# Patient Record
Sex: MALE | Race: WHITE | NOT HISPANIC OR LATINO | Employment: PART TIME | URBAN - NONMETROPOLITAN AREA
[De-identification: names, ages, dates, MRNs, and addresses within clinical notes are randomized per-mention and may not be internally consistent; named-entity substitution may affect disease eponyms.]

---

## 2023-04-13 DIAGNOSIS — K21.9 GASTROESOPHAGEAL REFLUX DISEASE, UNSPECIFIED WHETHER ESOPHAGITIS PRESENT: Primary | ICD-10-CM

## 2023-04-13 RX ORDER — FAMOTIDINE 20 MG/1
20 TABLET, FILM COATED ORAL 2 TIMES DAILY
Qty: 60 TABLET | Refills: 1 | Status: SHIPPED | OUTPATIENT
Start: 2023-04-13 | End: 2023-10-04 | Stop reason: SDUPTHER

## 2023-10-04 ENCOUNTER — OFFICE VISIT (OUTPATIENT)
Dept: PRIMARY CARE | Facility: CLINIC | Age: 38
End: 2023-10-04
Payer: COMMERCIAL

## 2023-10-04 VITALS
SYSTOLIC BLOOD PRESSURE: 92 MMHG | HEIGHT: 76 IN | WEIGHT: 171.8 LBS | BODY MASS INDEX: 20.92 KG/M2 | TEMPERATURE: 97.4 F | OXYGEN SATURATION: 99 % | HEART RATE: 67 BPM | DIASTOLIC BLOOD PRESSURE: 68 MMHG

## 2023-10-04 DIAGNOSIS — F51.04 PSYCHOPHYSIOLOGICAL INSOMNIA: ICD-10-CM

## 2023-10-04 DIAGNOSIS — J45.20 MILD INTERMITTENT ASTHMA WITHOUT COMPLICATION (HHS-HCC): ICD-10-CM

## 2023-10-04 DIAGNOSIS — K21.9 GASTROESOPHAGEAL REFLUX DISEASE, UNSPECIFIED WHETHER ESOPHAGITIS PRESENT: ICD-10-CM

## 2023-10-04 DIAGNOSIS — Z00.00 ROUTINE GENERAL MEDICAL EXAMINATION AT A HEALTH CARE FACILITY: ICD-10-CM

## 2023-10-04 DIAGNOSIS — I51.7 RIGHT VENTRICULAR HYPERTROPHY BY ELECTROCARDIOGRAM: ICD-10-CM

## 2023-10-04 DIAGNOSIS — F32.A ANXIETY AND DEPRESSION: Primary | ICD-10-CM

## 2023-10-04 DIAGNOSIS — J30.1 NON-SEASONAL ALLERGIC RHINITIS DUE TO POLLEN: ICD-10-CM

## 2023-10-04 DIAGNOSIS — E78.5 BORDERLINE HYPERLIPIDEMIA: ICD-10-CM

## 2023-10-04 DIAGNOSIS — E55.9 VITAMIN D INSUFFICIENCY: ICD-10-CM

## 2023-10-04 DIAGNOSIS — F41.9 ANXIETY AND DEPRESSION: Primary | ICD-10-CM

## 2023-10-04 PROBLEM — R53.83 FATIGUE: Status: ACTIVE | Noted: 2023-10-04

## 2023-10-04 PROBLEM — F17.210 CIGARETTE NICOTINE DEPENDENCE WITHOUT COMPLICATION: Status: ACTIVE | Noted: 2023-10-04

## 2023-10-04 PROBLEM — J30.89 NON-SEASONAL ALLERGIC RHINITIS: Status: ACTIVE | Noted: 2023-10-04

## 2023-10-04 PROBLEM — J01.01 ACUTE RECURRENT MAXILLARY SINUSITIS: Status: RESOLVED | Noted: 2023-10-04 | Resolved: 2023-10-04

## 2023-10-04 PROBLEM — S82.891A FRACTURE OF ANKLE, RIGHT, CLOSED: Status: RESOLVED | Noted: 2023-10-04 | Resolved: 2023-10-04

## 2023-10-04 PROBLEM — K40.90 LEFT INGUINAL HERNIA: Status: RESOLVED | Noted: 2023-10-04 | Resolved: 2023-10-04

## 2023-10-04 PROCEDURE — 99214 OFFICE O/P EST MOD 30 MIN: CPT | Performed by: PHYSICIAN ASSISTANT

## 2023-10-04 RX ORDER — FAMOTIDINE 20 MG/1
20 TABLET, FILM COATED ORAL 2 TIMES DAILY
Qty: 180 TABLET | Refills: 1 | Status: SHIPPED | OUTPATIENT
Start: 2023-10-04 | End: 2024-03-18

## 2023-10-04 RX ORDER — LORATADINE 10 MG/1
1 TABLET ORAL DAILY
COMMUNITY
Start: 2021-02-26 | End: 2023-10-04 | Stop reason: SDUPTHER

## 2023-10-04 RX ORDER — TRAZODONE HYDROCHLORIDE 50 MG/1
1 TABLET ORAL NIGHTLY
COMMUNITY
Start: 2020-10-08 | End: 2023-10-04 | Stop reason: SDUPTHER

## 2023-10-04 RX ORDER — LORATADINE 10 MG/1
10 TABLET ORAL DAILY
Qty: 90 TABLET | Refills: 1 | Status: SHIPPED | OUTPATIENT
Start: 2023-10-04 | End: 2024-04-01

## 2023-10-04 RX ORDER — TRAZODONE HYDROCHLORIDE 50 MG/1
50 TABLET ORAL NIGHTLY
Qty: 90 TABLET | Refills: 1 | Status: SHIPPED | OUTPATIENT
Start: 2023-10-04 | End: 2024-04-01

## 2023-10-04 RX ORDER — ESCITALOPRAM OXALATE 20 MG/1
20 TABLET ORAL DAILY
Qty: 90 TABLET | Refills: 1 | Status: SHIPPED | OUTPATIENT
Start: 2023-10-04 | End: 2024-04-01

## 2023-10-04 RX ORDER — BUSPIRONE HYDROCHLORIDE 15 MG/1
15 TABLET ORAL 2 TIMES DAILY
Qty: 360 TABLET | Refills: 0 | Status: SHIPPED | OUTPATIENT
Start: 2023-10-04 | End: 2024-04-01

## 2023-10-04 RX ORDER — FLUTICASONE PROPIONATE 50 MCG
2 SPRAY, SUSPENSION (ML) NASAL 2 TIMES DAILY
Qty: 16 G | Refills: 5 | Status: SHIPPED | OUTPATIENT
Start: 2023-10-04 | End: 2024-04-01

## 2023-10-04 RX ORDER — ALBUTEROL SULFATE 0.83 MG/ML
2.5 SOLUTION RESPIRATORY (INHALATION) EVERY 6 HOURS PRN
Qty: 90 ML | Refills: 3 | Status: SHIPPED | OUTPATIENT
Start: 2023-10-04 | End: 2024-01-16

## 2023-10-04 RX ORDER — ALBUTEROL SULFATE 90 UG/1
2 AEROSOL, METERED RESPIRATORY (INHALATION) EVERY 6 HOURS PRN
Qty: 18 G | Refills: 1 | Status: SHIPPED | OUTPATIENT
Start: 2023-10-04

## 2023-10-04 NOTE — ASSESSMENT & PLAN NOTE
Referred to cardiology  - Please call cardiology for follow up due to family history of 4 heart transplants on Mom's side, personal history of cardiomegaly and right ventricle enlargement. Son with pectus excavatum?

## 2023-10-04 NOTE — PATIENT INSTRUCTIONS
Please call cardiology for follow up due to family history of 4 heart transplants on Mom's side, personal history of cardiomegaly and right ventricle  enlargement.

## 2023-10-04 NOTE — PROGRESS NOTES
Subjective     HPI   Usman Villarreal is a 37 y.o. year old male patient with presenting to clinic with concern for   Chief Complaint   Patient presents with    New Patient Visit     Establish care.        Heart history, family cardiac hx of transplants.  Had several loved ones who  in the past 2 years. Now making time to care for himself.     Anxiety has worsened, requesting increased dose of buspar.     Patient History:  Smoker:            Current.  Pertinent History: Abnormal EKG, fatigue, possible right ventricular hypertrohy                     on EKG.       PHYSICIAN INTERPRETATION:  Left Ventricle: The left ventricular systolic function is normal, with an estimated ejection fraction of 60-65%. There are no regional wall motion abnormalities. The left ventricular cavity size is normal. Spectral Doppler shows a normal pattern of left ventricular diastolic filling.  Left Atrium: The left atrium is normal in size.  Right Ventricle: The right ventricle is slightly enlarged. There is normal right ventricular global systolic function.       CONCLUSIONS:   1. The left ventricular systolic function is normal with a 60-65% estimated ejection fraction    Patient Active Problem List   Diagnosis    Right ventricular hypertrophy by electrocardiogram    Anxiety and depression    Enlarged heart    Fatigue    Psychophysiological insomnia    Non-seasonal allergic rhinitis    Cigarette nicotine dependence without complication       Past Medical History:   Diagnosis Date    Left inguinal hernia 10/04/2023      Past Surgical History:   Procedure Laterality Date    OTHER SURGICAL HISTORY  2020    Hernia repair      Family History   Problem Relation Name Age of Onset    Colon cancer Mother      Heart disease Maternal Grandfather      Diabetes Other      Prostate cancer Other        Social History     Tobacco Use    Smoking status: Every Day     Packs/day: 0.50     Years: 25.00     Additional pack years: 0.00     Total  "pack years: 12.50     Types: Cigarettes    Smokeless tobacco: Never   Substance Use Topics    Alcohol use: Yes     Comment: social        Current Outpatient Medications:     albuterol 90 mcg/actuation inhaler, 2 puffs every 4 hours if needed., Disp: , Rfl:     busPIRone (Buspar) 15 mg tablet, Take 1 tablet (15 mg) by mouth once daily., Disp: , Rfl:     escitalopram (Lexapro) 20 mg tablet, Take 1 tablet (20 mg) by mouth once daily., Disp: , Rfl:     famotidine (Pepcid) 20 mg tablet, Take 1 tablet (20 mg) by mouth in the morning and 1 tablet (20 mg) before bedtime., Disp: 60 tablet, Rfl: 1    fluticasone (Flonase) 50 mcg/actuation nasal spray, Administer 2 sprays into each nostril 2 times a day., Disp: , Rfl:     loratadine (Claritin) 10 mg tablet, Take 1 tablet (10 mg) by mouth once daily., Disp: , Rfl:     traZODone (Desyrel) 50 mg tablet, Take 1 tablet (50 mg) by mouth once daily at bedtime., Disp: , Rfl:      Review of Systems  Constitutional: Denies fever  HEENT: Denies ST, earache  CVS: Denies Chest pain  Pulmonary: Denies wheezing, SOB  GI: Denies N/V  : Denies dysuria  Musculoskeletal:  Denies myalgia  Neuro: Denies focal weakness or numbness.  Skin: Denies Rashes.  *Review of Systems is negative unless otherwise mentioned in HPI or ROS above.    Objective   BP 92/68   Pulse 67   Temp 36.3 °C (97.4 °F)   Ht 1.93 m (6' 4\")   Wt 77.9 kg (171 lb 12.8 oz)   SpO2 99%   BMI 20.91 kg/m²  reviewed Body mass index is 20.91 kg/m².     Physical Exam  Constitutional: NAD.  Resting comfortably.  Head: Atraumatic, normocephalic.  ENT: Moist oral mucosa. Nasal mucosa wnl.   Cardiac: Regular rate & rhythm.   Pulmonary: Lungs clear bilat  GI: Soft, Nontender, nondistended.   Musculoskeletal: No peripheral edema.   Skin: No evidence of trauma. No rashes  Psych: Intact judgement and insight.    .Assessment/Plan   Problem List Items Addressed This Visit             ICD-10-CM    Right ventricular hypertrophy by " electrocardiogram I51.7     Left Ventricle: The left ventricular systolic function is normal, with an estimated ejection fraction of 60-65%. There are no regional wall motion abnormalities. The left ventricular cavity size is normal. Spectral Doppler shows a normal pattern of left ventricular diastolic filling.  Left Atrium: The left atrium is normal in size.  Right Ventricle: The right ventricle is slightly enlarged. There is normal right ventricular global systolic function.         Anxiety and depression - Primary F41.9, F32.A    Relevant Medications    busPIRone (Buspar) 15 mg tablet    escitalopram (Lexapro) 20 mg tablet    Psychophysiological insomnia F51.04    Relevant Medications    traZODone (Desyrel) 50 mg tablet    Non-seasonal allergic rhinitis J30.89    Relevant Medications    fluticasone (Flonase) 50 mcg/actuation nasal spray    loratadine (Claritin) 10 mg tablet     Other Visit Diagnoses         Codes    Gastroesophageal reflux disease, unspecified whether esophagitis present     K21.9    Relevant Medications    famotidine (Pepcid) 20 mg tablet    Mild intermittent asthma without complication     J45.20    Relevant Medications    albuterol 90 mcg/actuation inhaler    albuterol 2.5 mg /3 mL (0.083 %) nebulizer solution    Routine general medical examination at a health care facility     Z00.00    Borderline hyperlipidemia     E78.5    Vitamin D insufficiency     E55.9

## 2023-10-04 NOTE — ASSESSMENT & PLAN NOTE
Left Ventricle: The left ventricular systolic function is normal, with an estimated ejection fraction of 60-65%. There are no regional wall motion abnormalities. The left ventricular cavity size is normal. Spectral Doppler shows a normal pattern of left ventricular diastolic filling.  Left Atrium: The left atrium is normal in size.  Right Ventricle: The right ventricle is slightly enlarged. There is normal right ventricular global systolic function.

## 2023-11-05 PROBLEM — H60.339 SWIMMER'S EAR: Status: ACTIVE | Noted: 2023-11-05

## 2023-11-05 PROBLEM — M25.571 RIGHT ANKLE PAIN: Status: ACTIVE | Noted: 2023-11-05

## 2023-11-05 PROBLEM — H00.015 HORDEOLUM EXTERNUM OF LEFT LOWER EYELID: Status: ACTIVE | Noted: 2023-11-05

## 2023-11-05 PROBLEM — R94.31 ABNORMAL EKG: Status: ACTIVE | Noted: 2023-10-04

## 2023-11-05 PROBLEM — K40.91 UNILATERAL RECURRENT INGUINAL HERNIA WITHOUT OBSTRUCTION OR GANGRENE: Status: ACTIVE | Noted: 2023-11-05

## 2023-11-05 PROBLEM — F17.200 NICOTINE ADDICTION: Status: ACTIVE | Noted: 2023-10-04

## 2023-11-05 PROBLEM — K21.9 GERD (GASTROESOPHAGEAL REFLUX DISEASE): Status: ACTIVE | Noted: 2023-11-05

## 2023-11-05 PROBLEM — F41.1 GENERALIZED ANXIETY DISORDER: Status: ACTIVE | Noted: 2023-10-04

## 2023-11-05 RX ORDER — CHLORHEXIDINE GLUCONATE ORAL RINSE 1.2 MG/ML
15 SOLUTION DENTAL 2 TIMES DAILY
COMMUNITY
Start: 2023-01-17

## 2023-11-05 RX ORDER — HYDROCODONE BITARTRATE AND ACETAMINOPHEN 5; 325 MG/1; MG/1
1 TABLET ORAL AS NEEDED
COMMUNITY
Start: 2023-02-13

## 2023-11-05 RX ORDER — HYDROXYZINE HYDROCHLORIDE 50 MG/1
50 TABLET, FILM COATED ORAL 3 TIMES DAILY PRN
COMMUNITY
Start: 2021-02-08

## 2023-11-07 ENCOUNTER — OFFICE VISIT (OUTPATIENT)
Dept: CARDIOLOGY | Facility: CLINIC | Age: 38
End: 2023-11-07
Payer: COMMERCIAL

## 2023-11-07 ENCOUNTER — HOSPITAL ENCOUNTER (OUTPATIENT)
Dept: CARDIOLOGY | Facility: HOSPITAL | Age: 38
Discharge: HOME | End: 2023-11-07
Payer: COMMERCIAL

## 2023-11-07 VITALS
BODY MASS INDEX: 20.86 KG/M2 | SYSTOLIC BLOOD PRESSURE: 123 MMHG | OXYGEN SATURATION: 99 % | HEART RATE: 69 BPM | WEIGHT: 171.4 LBS | DIASTOLIC BLOOD PRESSURE: 77 MMHG

## 2023-11-07 DIAGNOSIS — R94.31 ABNORMAL EKG: ICD-10-CM

## 2023-11-07 DIAGNOSIS — F17.210 CIGARETTE NICOTINE DEPENDENCE WITHOUT COMPLICATION: ICD-10-CM

## 2023-11-07 DIAGNOSIS — R00.2 PALPITATIONS: ICD-10-CM

## 2023-11-07 DIAGNOSIS — I51.7 LVH (LEFT VENTRICULAR HYPERTROPHY): Primary | ICD-10-CM

## 2023-11-07 PROCEDURE — 93010 ELECTROCARDIOGRAM REPORT: CPT | Performed by: INTERNAL MEDICINE

## 2023-11-07 PROCEDURE — 99214 OFFICE O/P EST MOD 30 MIN: CPT | Performed by: NURSE PRACTITIONER

## 2023-11-07 PROCEDURE — 93005 ELECTROCARDIOGRAM TRACING: CPT

## 2023-11-07 NOTE — PROGRESS NOTES
Primary Care Physician: Lisa Chen PA-C  Primary Cardiologist:       Date of Visit: 11/07/2023 10:40 AM EST  [facility] of visit:  [facility]   Type of Visit: Follow up             Chief Complaint   Patient presents with    New Patient Visit     Referred by Lisa Chen/ follow up         HPI / Summary:   Usman Villarreal is a 37 y.o. male active smoker  with LVH by EKG    returns for routine follow up     C/o palpitations on exertion, resolve with rest.  No chest discomfort or unusual dyspnea.  He continues to smoke cigarettes.  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        History of cardiac transplant and sudden cardiac death on his mothers side of the family, although they are not close and he does not know the details       12 system review is negative except as noted above  Accompanied by a friend  who contributed to the history       Medical History:   Past Medical History:   Diagnosis Date    Left inguinal hernia 10/04/2023       Social History:   Tobacco Use: High Risk (11/7/2023)    Patient History     Smoking Tobacco Use: Every Day     Smokeless Tobacco Use: Never     Passive Exposure: Not on file         MEDICATIONS:   Current Outpatient Medications   Medication Instructions    albuterol 90 mcg/actuation inhaler 2 puffs, inhalation, Every 6 hours PRN    albuterol 2.5 mg, nebulization, Every 6 hours PRN    busPIRone (BUSPAR) 15 mg, oral, 2 times daily    chlorhexidine (Peridex) 0.12 % solution 15 mL, Mouth/Throat, 2 times daily, USE 15 ML BY MOUTH TWICE AS INSTRUCTED THE NIGHT BEFORE SURGERY AND THE MORNING OF.<BR>    escitalopram (LEXAPRO) 20 mg, oral, Daily    famotidine (PEPCID) 20 mg, oral, 2 times daily    fluticasone (Flonase) 50 mcg/actuation nasal spray 2 sprays, Each Nostril, 2 times daily    HYDROcodone-acetaminophen (Norco) 5-325 mg tablet 1 tablet, oral, As needed    hydrOXYzine HCL  (ATARAX) 50 mg, oral, 3 times daily PRN    loratadine (CLARITIN) 10 mg, oral, Daily    traZODone (DESYREL) 50 mg, oral, Nightly         IMAGING REVIEWED:   Echocardiogram:   Echocardiogram     Narrative  [facility], [address] [address]  Tel [phone] and Fax [phone]    TRANSTHORACIC ECHOCARDIOGRAM REPORT      Patient Name:     ANUM Moran Physician:  [id number] Bebeto MIXON MD  Study Date:       3/19/2021    Referring           MINERVA DOUGHERTY  Physician:  MRN/PID:          [id number]     PCP:  Accession/Order#: [id number] Department          [facility]  Location:  YOB: 1985   Fellow:  Gender:           M            Nurse:  Admit Date:                    Sonographer:        Linda Patrick RDCS, RDMS  Admission Status: Outpatient   Additional Staff:  Height:           193.04 cm    CC Report to:       [id number] Minerva Dougherty NP  Weight:           82.10 kg     Study Type:         Echocardiogram  BSA:              2.12 m2  Blood Pressure: 118 /73 mmHg    Diagnosis/ICD: R94.31-Abnormal electrocardiogram [ECG] [EKG]  Indication:    Abnormal EKG  Procedure/CPT: Echo Complete w Full [id number]    Patient History:  Smoker:            Current.  Pertinent History: Abnormal EKG, fatigue, possible right ventricular hypertrohy  on EKG.    Study Detail: The following Echo studies were performed: 2D, M-Mode, Doppler and  color flow. Technically challenging study due to body habitus. The  patient was awake.      PHYSICIAN INTERPRETATION:  Left Ventricle: The left ventricular systolic function is normal, with an estimated ejection fraction of 60-65%. There are no regional wall motion abnormalities. The left ventricular cavity size is normal. Spectral Doppler shows a normal pattern of left ventricular diastolic filling.  Left Atrium: The left atrium is normal in size.  Right Ventricle: The right ventricle is slightly enlarged. There  is normal right ventricular global systolic function.  Right Atrium: The right atrium is normal in size.  Aortic Valve: The aortic valve is trileaflet. There is no evidence of aortic valve regurgitation. The peak instantaneous gradient of the aortic valve is 6.2 mmHg.  Mitral Valve: The mitral valve is normal in structure. There is trace mitral valve regurgitation.  Tricuspid Valve: The tricuspid valve is structurally normal. There is trace to mild tricuspid regurgitation.  Pulmonic Valve: The pulmonic valve is structurally normal. There is physiologic pulmonic valve regurgitation.  Pericardium: There is no pericardial effusion noted.  Aorta: The aortic root is normal.      CONCLUSIONS:  1. The left ventricular systolic function is normal with a 60-65% estimated ejection fraction.    [id number] Bebeto Guadarrama MD  Electronically signed on 3/20/2021 at 6:17:30 PM        LABS:  CBC with Differential:    Lab Results   Component Value Date    WBC 7.5 08/19/2020    RBC 4.75 08/19/2020    HGB 14.7 08/19/2020    HCT 46.3 08/19/2020     08/19/2020    MCV 97 08/19/2020    MCHC 31.7 (L) 08/19/2020    RDW 13.4 08/19/2020    LYMPHOPCT 29.1 08/19/2020    MONOPCT 9.8 08/19/2020    EOSPCT 2.4 08/19/2020    BASOPCT 0.7 08/19/2020    MONOSABS 0.74 08/19/2020    LYMPHSABS 2.19 08/19/2020    EOSABS 0.18 08/19/2020    BASOSABS 0.05 08/19/2020     CMP:    Lab Results   Component Value Date     08/19/2020    K 4.3 08/19/2020     08/19/2020    CO2 27 08/19/2020    BUN 13 08/19/2020    CREATININE 0.93 08/19/2020    GLUCOSE 108 (H) 08/19/2020    PROT 7.1 08/19/2020    CALCIUM 9.8 08/19/2020    BILITOT 0.6 08/19/2020    ALKPHOS 45 08/19/2020    AST 28 08/19/2020    ALT 18 08/19/2020     BMP:    Lab Results   Component Value Date     08/19/2020    K 4.3 08/19/2020     08/19/2020    CO2 27 08/19/2020    BUN 13 08/19/2020    CREATININE 0.93 08/19/2020    CALCIUM 9.8 08/19/2020    GLUCOSE 108 (H) 08/19/2020  "    Magnesium:No results found for: \"MG\"  Troponin:  No results found for: \"TROPHS\"  BNP: No results found for: \"BNP\"      Lipid Panel:  No results found for: \"HDL\", \"CHHDL\", \"VLDL\", \"TRIG\", \"NHDL\"     Lab work and imaging results independently reviewed by me        ECG dated 11/7/2023 independently reviewed   SB 56 nonspecific IVCD       Constitutional:       Appearance: Healthy appearance. Not in distress.   Eyes:      Conjunctiva/sclera: Conjunctivae normal.   Neck:      Vascular: JVD normal.   Pulmonary:      Effort: Pulmonary effort is normal.      Breath sounds: Normal breath sounds.   Cardiovascular:      PMI at left midclavicular line. Normal rate. Regular rhythm. Normal S1. Normal S2.       Murmurs: There is no murmur.      No rub.   Pulses:     Intact distal pulses.   Edema:     Peripheral edema absent.   Abdominal:      General: Bowel sounds are normal.   Musculoskeletal:      Cervical back: Neck supple. Skin:     General: Skin is warm and dry.   Neurological:      Mental Status: Alert and oriented to person, place and time.           Problem List Items Addressed This Visit             ICD-10-CM    Abnormal EKG R94.31    Relevant Orders    ECG 12 Lead    LVH (left ventricular hypertrophy) - Primary I51.7    Relevant Orders    Transthoracic echo (TTE) complete    Holter or Event Cardiac Monitor    Nicotine addiction F17.200     He will think about quitting smoking         Palpitations R00.2    Relevant Orders    Transthoracic echo (TTE) complete    Holter or Event Cardiac Monitor       F/U 6 weeks for symptom check / review test results      Minerva Mitchell, APRN-CNP       Orders:  Orders Placed This Encounter   Procedures    Holter or Event Cardiac Monitor    ECG 12 Lead    Transthoracic echo (TTE) complete         Followup Appts:  Future Appointments   Date Time Provider Department Center   11/7/2023 11:20 AM GEN CR NONV1 ECG RESOURCE [facility]   11/28/2023  1:00 PM GEN ECHO [facility] "   11/28/2023  2:00 PM GEN [facility]   1/16/2024 10:00 AM [email or web address]

## 2023-11-22 LAB
ATRIAL RATE: 56 BPM
P AXIS: 61 DEGREES
P OFFSET: 193 MS
P ONSET: 137 MS
PR INTERVAL: 150 MS
Q ONSET: 212 MS
QRS COUNT: 9 BEATS
QRS DURATION: 118 MS
QT INTERVAL: 408 MS
QTC CALCULATION(BAZETT): 393 MS
QTC FREDERICIA: 399 MS
R AXIS: 90 DEGREES
T AXIS: 68 DEGREES
T OFFSET: 416 MS
VENTRICULAR RATE: 56 BPM

## 2023-11-28 ENCOUNTER — APPOINTMENT (OUTPATIENT)
Dept: CARDIOLOGY | Facility: HOSPITAL | Age: 38
End: 2023-11-28
Payer: COMMERCIAL

## 2024-01-15 NOTE — PROGRESS NOTES
Subjective     HPI   Usman Villarreal is a 38 y.o. year old male patient with presenting to clinic with concern for   Chief Complaint   Patient presents with    Follow-up     3 mth. Veins are bulging are both legs.          Usman followed up with cardiology since our last visit. Heart history, family cardiac hx of transplants.  Had several loved ones who  in the past 2 years. Now making time to care for himself.     Anxiety & depression- Buspar & lexapro 20   increased dose of buspar last visit  Insomnia trazodone 50mg    Has stopped EtOH.    Saw Minerva Mitchell CNP also advised smoking cessation and ordered the following:  He should have followed up a few weeks ago, but cancelled.      Orders Placed This Encounter   Procedures    Holter or Event Cardiac Monitor    ECG 12 Lead    Transthoracic echo (TTE) complete         Patient Active Problem List   Diagnosis    Abnormal EKG    Generalized anxiety disorder    LVH (left ventricular hypertrophy)    Fatigue    Psychophysiological insomnia    Non-seasonal allergic rhinitis    Nicotine addiction    Hordeolum externum of left lower eyelid    Right ankle pain    Swimmer's ear    GERD (gastroesophageal reflux disease)    Unilateral recurrent inguinal hernia without obstruction or gangrene    Palpitations       Past Medical History:   Diagnosis Date    Left inguinal hernia 10/04/2023      Past Surgical History:   Procedure Laterality Date    OTHER SURGICAL HISTORY  2020    Hernia repair      Family History   Problem Relation Name Age of Onset    Colon cancer Mother      Other (CARDIAC DISORDER) Mother      Other (CARDIAC DISORDER) Father      Heart disease Maternal Grandfather      Diabetes Other      Prostate cancer Other        Social History     Tobacco Use    Smoking status: Every Day     Packs/day: 0.50     Years: 25.00     Additional pack years: 0.00     Total pack years: 12.50     Types: Cigarettes    Smokeless tobacco: Never   Substance Use Topics    Alcohol  "use: Yes     Comment: social        Current Outpatient Medications:     albuterol 2.5 mg /3 mL (0.083 %) nebulizer solution, Take 3 mL (2.5 mg) by nebulization every 6 hours if needed for wheezing or shortness of breath., Disp: 90 mL, Rfl: 3    albuterol 90 mcg/actuation inhaler, Inhale 2 puffs every 6 hours if needed for wheezing or shortness of breath., Disp: 18 g, Rfl: 1    busPIRone (Buspar) 15 mg tablet, Take 1 tablet (15 mg) by mouth 2 times a day., Disp: 360 tablet, Rfl: 0    chlorhexidine (Peridex) 0.12 % solution, Use 15 mL in the mouth or throat 2 times a day. USE 15 ML BY MOUTH TWICE AS INSTRUCTED THE NIGHT BEFORE SURGERY AND THE MORNING OF., Disp: , Rfl:     escitalopram (Lexapro) 20 mg tablet, Take 1 tablet (20 mg) by mouth once daily., Disp: 90 tablet, Rfl: 1    famotidine (Pepcid) 20 mg tablet, Take 1 tablet (20 mg) by mouth 2 times a day., Disp: 180 tablet, Rfl: 1    fluticasone (Flonase) 50 mcg/actuation nasal spray, Administer 2 sprays into each nostril 2 times a day., Disp: 16 g, Rfl: 5    HYDROcodone-acetaminophen (Norco) 5-325 mg tablet, Take 1 tablet by mouth if needed (EVERY 4 TO 6 HOURS AS NEEDED FOR PAIN)., Disp: , Rfl:     hydrOXYzine HCL (Atarax) 50 mg tablet, Take 1 tablet (50 mg) by mouth 3 times a day as needed., Disp: , Rfl:     loratadine (Claritin) 10 mg tablet, Take 1 tablet (10 mg) by mouth once daily., Disp: 90 tablet, Rfl: 1    traZODone (Desyrel) 50 mg tablet, Take 1 tablet (50 mg) by mouth once daily at bedtime., Disp: 90 tablet, Rfl: 1     Review of Systems  Constitutional: Denies fever  HEENT: Denies ST, earache  CVS: Denies Chest pain  Pulmonary: Denies wheezing, SOB  GI: Denies N/V  : Denies dysuria  Musculoskeletal:  Denies myalgia  Neuro: Denies focal weakness or numbness.  Skin: Denies Rashes.  *Review of Systems is negative unless otherwise mentioned in HPI or ROS above.    Objective   /68   Pulse 72   Temp 37 °C (98.6 °F)   Ht 1.93 m (6' 4\")   Wt 77.6 kg " (171 lb)   SpO2 97%   BMI 20.81 kg/m²  reviewed Body mass index is 20.81 kg/m².     Physical Exam  Constitutional: NAD.  Resting comfortably.  Head: Atraumatic, normocephalic.  ENT: Moist oral mucosa. Nasal mucosa wnl.   Cardiac: Regular rate & rhythm.   Pulmonary: Lungs clear bilat  GI: Soft, Nontender, nondistended.   Musculoskeletal: No peripheral edema. R LE large varicosities, tortuosities, tender and painful  Skin: No evidence of trauma. No rashes  Psych: Intact judgement and insight.    Pt is wearing compression socks regularly. Referred to vascular    .Assessment/Plan   Problem List Items Addressed This Visit    None  Visit Diagnoses         Codes    Varicose veins of right lower extremity with pain    -  Primary I83.811    Relevant Orders    Referral to Vascular Medicine

## 2024-01-16 ENCOUNTER — OFFICE VISIT (OUTPATIENT)
Dept: PRIMARY CARE | Facility: CLINIC | Age: 39
End: 2024-01-16
Payer: COMMERCIAL

## 2024-01-16 VITALS
BODY MASS INDEX: 20.82 KG/M2 | TEMPERATURE: 98.6 F | HEART RATE: 72 BPM | SYSTOLIC BLOOD PRESSURE: 118 MMHG | OXYGEN SATURATION: 97 % | DIASTOLIC BLOOD PRESSURE: 68 MMHG | HEIGHT: 76 IN | WEIGHT: 171 LBS

## 2024-01-16 DIAGNOSIS — I83.811 VARICOSE VEINS OF RIGHT LOWER EXTREMITY WITH PAIN: Primary | ICD-10-CM

## 2024-01-16 PROCEDURE — 99214 OFFICE O/P EST MOD 30 MIN: CPT | Performed by: PHYSICIAN ASSISTANT

## 2024-01-16 ASSESSMENT — PATIENT HEALTH QUESTIONNAIRE - PHQ9
SUM OF ALL RESPONSES TO PHQ9 QUESTIONS 1 AND 2: 0
1. LITTLE INTEREST OR PLEASURE IN DOING THINGS: NOT AT ALL
2. FEELING DOWN, DEPRESSED OR HOPELESS: NOT AT ALL

## 2024-03-16 DIAGNOSIS — K21.9 GASTROESOPHAGEAL REFLUX DISEASE, UNSPECIFIED WHETHER ESOPHAGITIS PRESENT: ICD-10-CM

## 2024-03-18 RX ORDER — FAMOTIDINE 20 MG/1
20 TABLET, FILM COATED ORAL 2 TIMES DAILY
Qty: 180 TABLET | Refills: 1 | Status: SHIPPED | OUTPATIENT
Start: 2024-03-18 | End: 2024-09-14

## 2024-09-30 DIAGNOSIS — K21.9 GASTROESOPHAGEAL REFLUX DISEASE, UNSPECIFIED WHETHER ESOPHAGITIS PRESENT: ICD-10-CM

## 2024-09-30 RX ORDER — FAMOTIDINE 20 MG/1
TABLET, FILM COATED ORAL
Qty: 180 TABLET | Refills: 1 | Status: SHIPPED | OUTPATIENT
Start: 2024-09-30

## 2025-07-05 ENCOUNTER — APPOINTMENT (OUTPATIENT)
Dept: RADIOLOGY | Facility: HOSPITAL | Age: 40
End: 2025-07-05
Payer: COMMERCIAL

## 2025-07-05 ENCOUNTER — HOSPITAL ENCOUNTER (EMERGENCY)
Facility: HOSPITAL | Age: 40
Discharge: ANOTHER HOSPITAL | End: 2025-07-06
Attending: STUDENT IN AN ORGANIZED HEALTH CARE EDUCATION/TRAINING PROGRAM
Payer: COMMERCIAL

## 2025-07-05 DIAGNOSIS — S05.92XA LEFT EYE INJURY, INITIAL ENCOUNTER: Primary | ICD-10-CM

## 2025-07-05 LAB
ERYTHROCYTE [DISTWIDTH] IN BLOOD BY AUTOMATED COUNT: 13.2 % (ref 11.5–14.5)
HCT VFR BLD AUTO: 39.4 % (ref 41–52)
HGB BLD-MCNC: 12.9 G/DL (ref 13.5–17.5)
MCH RBC QN AUTO: 30.8 PG (ref 26–34)
MCHC RBC AUTO-ENTMCNC: 32.7 G/DL (ref 32–36)
MCV RBC AUTO: 94 FL (ref 80–100)
NRBC BLD-RTO: 0 /100 WBCS (ref 0–0)
PLATELET # BLD AUTO: 190 X10*3/UL (ref 150–450)
RBC # BLD AUTO: 4.19 X10*6/UL (ref 4.5–5.9)
WBC # BLD AUTO: 11.6 X10*3/UL (ref 4.4–11.3)

## 2025-07-05 PROCEDURE — 76377 3D RENDER W/INTRP POSTPROCES: CPT | Performed by: RADIOLOGY

## 2025-07-05 PROCEDURE — 96375 TX/PRO/DX INJ NEW DRUG ADDON: CPT

## 2025-07-05 PROCEDURE — 70450 CT HEAD/BRAIN W/O DYE: CPT | Performed by: RADIOLOGY

## 2025-07-05 PROCEDURE — 96374 THER/PROPH/DIAG INJ IV PUSH: CPT

## 2025-07-05 PROCEDURE — 2500000004 HC RX 250 GENERAL PHARMACY W/ HCPCS (ALT 636 FOR OP/ED): Mod: JZ

## 2025-07-05 PROCEDURE — 2500000005 HC RX 250 GENERAL PHARMACY W/O HCPCS: Performed by: STUDENT IN AN ORGANIZED HEALTH CARE EDUCATION/TRAINING PROGRAM

## 2025-07-05 PROCEDURE — 85610 PROTHROMBIN TIME: CPT | Performed by: STUDENT IN AN ORGANIZED HEALTH CARE EDUCATION/TRAINING PROGRAM

## 2025-07-05 PROCEDURE — 36415 COLL VENOUS BLD VENIPUNCTURE: CPT | Performed by: STUDENT IN AN ORGANIZED HEALTH CARE EDUCATION/TRAINING PROGRAM

## 2025-07-05 PROCEDURE — 70486 CT MAXILLOFACIAL W/O DYE: CPT

## 2025-07-05 PROCEDURE — 85027 COMPLETE CBC AUTOMATED: CPT | Performed by: STUDENT IN AN ORGANIZED HEALTH CARE EDUCATION/TRAINING PROGRAM

## 2025-07-05 PROCEDURE — 99291 CRITICAL CARE FIRST HOUR: CPT | Performed by: STUDENT IN AN ORGANIZED HEALTH CARE EDUCATION/TRAINING PROGRAM

## 2025-07-05 PROCEDURE — 85730 THROMBOPLASTIN TIME PARTIAL: CPT | Performed by: STUDENT IN AN ORGANIZED HEALTH CARE EDUCATION/TRAINING PROGRAM

## 2025-07-05 PROCEDURE — 70450 CT HEAD/BRAIN W/O DYE: CPT

## 2025-07-05 PROCEDURE — 96361 HYDRATE IV INFUSION ADD-ON: CPT

## 2025-07-05 PROCEDURE — 76377 3D RENDER W/INTRP POSTPROCES: CPT

## 2025-07-05 PROCEDURE — 70486 CT MAXILLOFACIAL W/O DYE: CPT | Performed by: RADIOLOGY

## 2025-07-05 PROCEDURE — 2500000004 HC RX 250 GENERAL PHARMACY W/ HCPCS (ALT 636 FOR OP/ED): Performed by: STUDENT IN AN ORGANIZED HEALTH CARE EDUCATION/TRAINING PROGRAM

## 2025-07-05 PROCEDURE — 93005 ELECTROCARDIOGRAM TRACING: CPT

## 2025-07-05 PROCEDURE — 82077 ASSAY SPEC XCP UR&BREATH IA: CPT | Performed by: STUDENT IN AN ORGANIZED HEALTH CARE EDUCATION/TRAINING PROGRAM

## 2025-07-05 RX ORDER — ONDANSETRON HYDROCHLORIDE 2 MG/ML
INJECTION, SOLUTION INTRAVENOUS
Status: COMPLETED
Start: 2025-07-05 | End: 2025-07-05

## 2025-07-05 RX ORDER — ONDANSETRON HYDROCHLORIDE 2 MG/ML
4 INJECTION, SOLUTION INTRAVENOUS ONCE
Status: COMPLETED | OUTPATIENT
Start: 2025-07-05 | End: 2025-07-05

## 2025-07-05 RX ORDER — HYDROCODONE BITARTRATE AND ACETAMINOPHEN 5; 325 MG/1; MG/1
1 TABLET ORAL ONCE
Refills: 0 | Status: DISCONTINUED | OUTPATIENT
Start: 2025-07-05 | End: 2025-07-05

## 2025-07-05 RX ORDER — BACITRACIN ZINC 500 UNIT/G
1 OINTMENT IN PACKET (EA) TOPICAL ONCE
Status: COMPLETED | OUTPATIENT
Start: 2025-07-05 | End: 2025-07-06

## 2025-07-05 RX ORDER — MORPHINE SULFATE 2 MG/ML
2 INJECTION, SOLUTION INTRAMUSCULAR; INTRAVENOUS ONCE
Status: COMPLETED | OUTPATIENT
Start: 2025-07-05 | End: 2025-07-05

## 2025-07-05 RX ORDER — TETRACAINE HYDROCHLORIDE 5 MG/ML
1 SOLUTION OPHTHALMIC ONCE
Status: COMPLETED | OUTPATIENT
Start: 2025-07-05 | End: 2025-07-05

## 2025-07-05 RX ADMIN — ONDANSETRON HYDROCHLORIDE 4 MG: 2 INJECTION, SOLUTION INTRAVENOUS at 23:34

## 2025-07-05 RX ADMIN — MORPHINE SULFATE 2 MG: 2 INJECTION, SOLUTION INTRAMUSCULAR; INTRAVENOUS at 23:28

## 2025-07-05 RX ADMIN — SODIUM CHLORIDE 1000 ML: 0.9 INJECTION, SOLUTION INTRAVENOUS at 23:28

## 2025-07-05 RX ADMIN — ONDANSETRON HYDROCHLORIDE 4 MG: 2 SOLUTION INTRAMUSCULAR; INTRAVENOUS at 23:34

## 2025-07-05 RX ADMIN — FLUORESCEIN SODIUM 1 STRIP: 1 STRIP OPHTHALMIC at 22:45

## 2025-07-05 RX ADMIN — TETRACAINE HYDROCHLORIDE 1 DROP: 5 SOLUTION OPHTHALMIC at 22:45

## 2025-07-05 ASSESSMENT — PAIN DESCRIPTION - PAIN TYPE: TYPE: ACUTE PAIN

## 2025-07-05 ASSESSMENT — PAIN DESCRIPTION - ORIENTATION: ORIENTATION: LEFT

## 2025-07-05 ASSESSMENT — LIFESTYLE VARIABLES
EVER FELT BAD OR GUILTY ABOUT YOUR DRINKING: NO
HAVE PEOPLE ANNOYED YOU BY CRITICIZING YOUR DRINKING: NO
HAVE YOU EVER FELT YOU SHOULD CUT DOWN ON YOUR DRINKING: NO
EVER HAD A DRINK FIRST THING IN THE MORNING TO STEADY YOUR NERVES TO GET RID OF A HANGOVER: NO
TOTAL SCORE: 0

## 2025-07-05 ASSESSMENT — PAIN - FUNCTIONAL ASSESSMENT: PAIN_FUNCTIONAL_ASSESSMENT: 0-10

## 2025-07-05 ASSESSMENT — PAIN DESCRIPTION - LOCATION: LOCATION: HEAD

## 2025-07-05 ASSESSMENT — PAIN DESCRIPTION - DESCRIPTORS: DESCRIPTORS: ACHING

## 2025-07-05 ASSESSMENT — PAIN SCALES - GENERAL: PAINLEVEL_OUTOF10: 10 - WORST POSSIBLE PAIN

## 2025-07-06 ENCOUNTER — HOSPITAL ENCOUNTER (EMERGENCY)
Facility: HOSPITAL | Age: 40
Discharge: HOME | End: 2025-07-06
Attending: EMERGENCY MEDICINE
Payer: COMMERCIAL

## 2025-07-06 ENCOUNTER — APPOINTMENT (OUTPATIENT)
Dept: CARDIOLOGY | Facility: HOSPITAL | Age: 40
End: 2025-07-06
Payer: COMMERCIAL

## 2025-07-06 VITALS
OXYGEN SATURATION: 98 % | HEART RATE: 46 BPM | BODY MASS INDEX: 22.43 KG/M2 | RESPIRATION RATE: 16 BRPM | DIASTOLIC BLOOD PRESSURE: 91 MMHG | WEIGHT: 190 LBS | HEIGHT: 77 IN | SYSTOLIC BLOOD PRESSURE: 137 MMHG

## 2025-07-06 VITALS
OXYGEN SATURATION: 98 % | HEIGHT: 76 IN | DIASTOLIC BLOOD PRESSURE: 72 MMHG | WEIGHT: 195 LBS | RESPIRATION RATE: 14 BRPM | TEMPERATURE: 96.8 F | BODY MASS INDEX: 23.75 KG/M2 | HEART RATE: 41 BPM | SYSTOLIC BLOOD PRESSURE: 115 MMHG

## 2025-07-06 DIAGNOSIS — H21.02 HYPHEMA OF LEFT EYE: Primary | ICD-10-CM

## 2025-07-06 LAB
ANION GAP SERPL CALC-SCNC: 12 MMOL/L (ref 10–20)
APTT PPP: 19 SECONDS (ref 26–36)
BUN SERPL-MCNC: 16 MG/DL (ref 6–23)
CALCIUM SERPL-MCNC: 8.3 MG/DL (ref 8.6–10.3)
CHLORIDE SERPL-SCNC: 105 MMOL/L (ref 98–107)
CO2 SERPL-SCNC: 24 MMOL/L (ref 21–32)
CREAT SERPL-MCNC: 0.79 MG/DL (ref 0.5–1.3)
EGFRCR SERPLBLD CKD-EPI 2021: >90 ML/MIN/1.73M*2
ETHANOL SERPL-MCNC: <10 MG/DL
GLUCOSE SERPL-MCNC: 115 MG/DL (ref 74–99)
INR PPP: 1 (ref 0.9–1.1)
POTASSIUM SERPL-SCNC: 3.7 MMOL/L (ref 3.5–5.3)
PROTHROMBIN TIME: 11.3 SECONDS (ref 9.8–12.4)
SODIUM SERPL-SCNC: 137 MMOL/L (ref 136–145)

## 2025-07-06 PROCEDURE — 2500000004 HC RX 250 GENERAL PHARMACY W/ HCPCS (ALT 636 FOR OP/ED): Mod: JZ,SE | Performed by: EMERGENCY MEDICINE

## 2025-07-06 PROCEDURE — 2500000004 HC RX 250 GENERAL PHARMACY W/ HCPCS (ALT 636 FOR OP/ED): Mod: JZ | Performed by: STUDENT IN AN ORGANIZED HEALTH CARE EDUCATION/TRAINING PROGRAM

## 2025-07-06 PROCEDURE — 96375 TX/PRO/DX INJ NEW DRUG ADDON: CPT

## 2025-07-06 PROCEDURE — 2500000001 HC RX 250 WO HCPCS SELF ADMINISTERED DRUGS (ALT 637 FOR MEDICARE OP): Mod: SE | Performed by: EMERGENCY MEDICINE

## 2025-07-06 PROCEDURE — 82374 ASSAY BLOOD CARBON DIOXIDE: CPT | Performed by: STUDENT IN AN ORGANIZED HEALTH CARE EDUCATION/TRAINING PROGRAM

## 2025-07-06 PROCEDURE — 96372 THER/PROPH/DIAG INJ SC/IM: CPT | Performed by: EMERGENCY MEDICINE

## 2025-07-06 PROCEDURE — 99284 EMERGENCY DEPT VISIT MOD MDM: CPT | Performed by: EMERGENCY MEDICINE

## 2025-07-06 PROCEDURE — 36415 COLL VENOUS BLD VENIPUNCTURE: CPT | Performed by: STUDENT IN AN ORGANIZED HEALTH CARE EDUCATION/TRAINING PROGRAM

## 2025-07-06 PROCEDURE — 2500000005 HC RX 250 GENERAL PHARMACY W/O HCPCS: Performed by: STUDENT IN AN ORGANIZED HEALTH CARE EDUCATION/TRAINING PROGRAM

## 2025-07-06 PROCEDURE — 99285 EMERGENCY DEPT VISIT HI MDM: CPT | Performed by: EMERGENCY MEDICINE

## 2025-07-06 RX ORDER — CYCLOPENTOLATE HYDROCHLORIDE 10 MG/ML
1 SOLUTION/ DROPS OPHTHALMIC 2 TIMES DAILY
Qty: 15 ML | Refills: 0 | Status: SHIPPED | OUTPATIENT
Start: 2025-07-06 | End: 2025-07-06

## 2025-07-06 RX ORDER — OXYCODONE AND ACETAMINOPHEN 5; 325 MG/1; MG/1
1 TABLET ORAL EVERY 6 HOURS PRN
Qty: 12 TABLET | Refills: 0 | Status: SHIPPED | OUTPATIENT
Start: 2025-07-06 | End: 2025-07-09

## 2025-07-06 RX ORDER — OXYCODONE AND ACETAMINOPHEN 5; 325 MG/1; MG/1
1 TABLET ORAL ONCE
Refills: 0 | Status: CANCELLED | OUTPATIENT
Start: 2025-07-06 | End: 2025-07-06

## 2025-07-06 RX ORDER — ONDANSETRON 4 MG/1
4 TABLET, ORALLY DISINTEGRATING ORAL ONCE
Status: CANCELLED | OUTPATIENT
Start: 2025-07-06 | End: 2025-07-06

## 2025-07-06 RX ORDER — ERYTHROMYCIN 5 MG/G
OINTMENT OPHTHALMIC NIGHTLY
Qty: 3.5 G | Refills: 0 | Status: SHIPPED | OUTPATIENT
Start: 2025-07-06 | End: 2025-07-06

## 2025-07-06 RX ORDER — PREDNISOLONE ACETATE 10 MG/ML
1 SUSPENSION/ DROPS OPHTHALMIC 2 TIMES DAILY
Qty: 15 ML | Refills: 0 | Status: SHIPPED | OUTPATIENT
Start: 2025-07-06 | End: 2025-07-06

## 2025-07-06 RX ORDER — CYCLOPENTOLATE HYDROCHLORIDE 10 MG/ML
1 SOLUTION/ DROPS OPHTHALMIC 2 TIMES DAILY
Qty: 15 ML | Refills: 0 | Status: SHIPPED | OUTPATIENT
Start: 2025-07-06 | End: 2025-07-07 | Stop reason: SDUPTHER

## 2025-07-06 RX ORDER — METOCLOPRAMIDE HYDROCHLORIDE 5 MG/ML
5 INJECTION INTRAMUSCULAR; INTRAVENOUS ONCE
Status: COMPLETED | OUTPATIENT
Start: 2025-07-06 | End: 2025-07-06

## 2025-07-06 RX ORDER — PREDNISOLONE ACETATE 10 MG/ML
1 SUSPENSION/ DROPS OPHTHALMIC 2 TIMES DAILY
Qty: 15 ML | Refills: 0 | Status: SHIPPED | OUTPATIENT
Start: 2025-07-06 | End: 2025-07-07 | Stop reason: SDUPTHER

## 2025-07-06 RX ORDER — ERYTHROMYCIN 5 MG/G
OINTMENT OPHTHALMIC NIGHTLY
Qty: 3.5 G | Refills: 0 | Status: SHIPPED | OUTPATIENT
Start: 2025-07-06 | End: 2025-07-20

## 2025-07-06 RX ORDER — PROMETHAZINE HYDROCHLORIDE 25 MG/1
25 TABLET ORAL EVERY 6 HOURS PRN
Qty: 20 TABLET | Refills: 0 | Status: SHIPPED | OUTPATIENT
Start: 2025-07-06

## 2025-07-06 RX ORDER — OXYCODONE AND ACETAMINOPHEN 5; 325 MG/1; MG/1
1 TABLET ORAL ONCE
Status: COMPLETED | OUTPATIENT
Start: 2025-07-06 | End: 2025-07-06

## 2025-07-06 RX ADMIN — TRIMETHOBENZAMIDE HYDROCHLORIDE 200 MG: 100 INJECTION INTRAMUSCULAR at 05:31

## 2025-07-06 RX ADMIN — BACITRACIN 1 APPLICATION: 500 OINTMENT TOPICAL at 00:45

## 2025-07-06 RX ADMIN — METOCLOPRAMIDE 5 MG: 5 INJECTION, SOLUTION INTRAMUSCULAR; INTRAVENOUS at 01:25

## 2025-07-06 RX ADMIN — OXYCODONE HYDROCHLORIDE AND ACETAMINOPHEN 1 TABLET: 5; 325 TABLET ORAL at 05:31

## 2025-07-06 ASSESSMENT — LIFESTYLE VARIABLES
HAVE YOU EVER FELT YOU SHOULD CUT DOWN ON YOUR DRINKING: NO
EVER FELT BAD OR GUILTY ABOUT YOUR DRINKING: NO
TOTAL SCORE: 0
EVER HAD A DRINK FIRST THING IN THE MORNING TO STEADY YOUR NERVES TO GET RID OF A HANGOVER: NO
HAVE PEOPLE ANNOYED YOU BY CRITICIZING YOUR DRINKING: NO

## 2025-07-06 ASSESSMENT — PAIN SCALES - GENERAL: PAINLEVEL_OUTOF10: 10 - WORST POSSIBLE PAIN

## 2025-07-06 ASSESSMENT — PAIN DESCRIPTION - PAIN TYPE: TYPE: ACUTE PAIN

## 2025-07-06 ASSESSMENT — PAIN DESCRIPTION - ORIENTATION: ORIENTATION: LEFT

## 2025-07-06 ASSESSMENT — PAIN DESCRIPTION - LOCATION: LOCATION: EYE

## 2025-07-06 ASSESSMENT — PAIN - FUNCTIONAL ASSESSMENT: PAIN_FUNCTIONAL_ASSESSMENT: 0-10
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
## 2025-07-06 NOTE — ED PROVIDER NOTES
History/Exam limitations: none  HPI was provided by patient    HPI:    Chief Complaint   Patient presents with    Loss of Vision    Eye Trauma        Usman Villarreal is a 39 y.o. male presents with chief complaint of vision loss and eye trauma.  Occurred on the left eye just prior to arrival states and in the ED when off by his eye and a piece of it hit the left side of his eye.  He has he stated about 90% vision loss and it with swelling over the eye and pain.  Small superficial laceration over the left eye.  Tried placing pressure and rag over it prior to arrival.    ROS:  CONSTITUTIONAL denies fever, denies weakness.  ENT denies sore throat.  CARDIOVASCULAR denies chest pain.  RESPIRATORY denies cough, denies shortness of breath.  GI denies abdominal pain, denies vomiting.  GENITOURINARY denies dysuria.  MUSCULOSKELETAL denies deformity, denies neck pain.  NEUROLOGIC admits to headache.  NOTES: All systems reviewed, negative except as described above.     Physical Exam  HEAD:  normocephalic.  EYES:   Extraocular muscles intact, left eye injected with hyphema to left eye limiting possible examination.  irregular pupil on the left.  Left upper eyelid swelling tender to palpation with superficial laceration.  See photo below  ENT: Ear exam normal, external ear normal, Nose exam normal, no nasal deformity, Pharynx exam normal, injected.  RESPIRATORY/CHEST: no respiratory distress, Breath sounds clear. Regular rate and rhythm.  GASTROINTESTINAL: Soft, nontender and non-distended. Normal bowel sounds.  UPPER EXTREMITY: Upper extremity exam included findings of inspection normal,  Motor strength normal, Sensation intact, Brachial pulse normal, Radial pulse normal.  LOWER EXTREMITY: Lower extremity exam included findings of inspection normal, Range of motion normal.  NEURO:  oriented to person, place and time, Speech normal.  SKIN: Skin exam included findings of skin warm, dry, and normal in color.  LYMPHATIC: Lymphatic  exam included findings of cervical nodes normal.         MDM  Patient was seen and evaluated for eye trauma.  Differential include not limited to intracranial hemorrhage,intraocular foreign body present/hyphema/retrobulbar hematoma, globe rupture, tear to the iris.  CT imaging was performed.  Tetanus was updated.  Using fluorescein Wood's lamp and tetracaine I do not appreciate any conjunctival foreign bodies or positive Ivana sign but is difficult to ascertain due to patient closing eye and only able to open it partially during exam..  Has full range of motion to eye movements.     Imaging studies if performed were independently reviewed and interpreted by myself and confirmed by radiologist.        External Records Reviewed: I reviewed recent and relevant outside records          I discussed the differential, results and plan with the patient and/or family/friend/caregiver if present.          Note: This note was dictated by speech recognition. Minor errors in transcription may be present.    ED Course as of 07/06/25 0557   Sat Jul 05, 2025   2242 Using Sohail-Pen at bedside pressures to the left eye were 11-14 range. [WL]   2306 We do not have ophthalmology here.  Spoke with Dr. Gomes who recommended transferring patient from ED to ED.  Spoke with Dr. Quintana who accepted patient as transfer [WL]   2353 EKG interpreted by me shows sinus bradycardia no STEMI rate 43 compared to prior EKG similar findings present [WL]   Sun Jul 06, 2025   0257 CT imaging reassuring [WL]   0257 Patient having episodes here of sinus bradycardia but in no acute distress.  Looking back in the chart it looks like he has runs of lower blood pressure and bradycardia [WL]      ED Course User Index  [WL] Mike Woodard DO         Diagnoses as of 07/06/25 0557   Left eye injury, initial encounter         Past Medical History  He has a past medical history of Left inguinal hernia (10/04/2023).    Surgical History  He has a past surgical  history that includes Other surgical history (08/19/2020).     Social History  He reports that he has been smoking cigarettes. He has a 12.5 pack-year smoking history. He has never used smokeless tobacco. He reports current alcohol use. He reports that he does not use drugs.   Current Outpatient Medications   Medication Instructions    albuterol 90 mcg/actuation inhaler 2 puffs, inhalation, Every 6 hours PRN    albuterol 2.5 mg, nebulization, Every 6 hours PRN    busPIRone (BUSPAR) 15 mg, oral, 2 times daily    chlorhexidine (Peridex) 0.12 % solution 15 mL, Mouth/Throat, 2 times daily, USE 15 ML BY MOUTH TWICE AS INSTRUCTED THE NIGHT BEFORE SURGERY AND THE MORNING OF.      cyclopentolate (CyclogyL) 1 % ophthalmic solution 1 drop, Left Eye, 2 times daily    erythromycin (Romycin) 5 mg/gram (0.5 %) ophthalmic ointment Left Eye, Nightly, Apply Amount per Dose: 0.5 inch (~1 cm) per dose to the laceration around your eye.    escitalopram (LEXAPRO) 20 mg, oral, Daily    famotidine (Pepcid) 20 mg tablet TAKE ONE TABLET BY MOUTH TWO TIMES A DAY - SCHEDULE FOLLOW UP FOR ADDITIONAL REFILLS    fluticasone (Flonase) 50 mcg/actuation nasal spray 2 sprays, Each Nostril, 2 times daily    HYDROcodone-acetaminophen (Norco) 5-325 mg tablet 1 tablet, oral, As needed    hydrOXYzine HCL (ATARAX) 50 mg, oral, 3 times daily PRN    loratadine (CLARITIN) 10 mg, oral, Daily    oxyCODONE-acetaminophen (Percocet) 5-325 mg tablet 1 tablet, oral, Every 6 hours PRN    prednisoLONE acetate (Pred-Forte) 1 % ophthalmic suspension 1 drop, Left Eye, 2 times daily    traZODone (DESYREL) 50 mg, oral, Nightly     RX Allergies[1]          ED Triage Vitals [07/05/25 2154]   Temperature Heart Rate Respirations BP   36 °C (96.8 °F) 52 16 99/62      Pulse Ox Temp Source Heart Rate Source Patient Position   100 % Temporal Monitor Sitting      BP Location FiO2 (%)     Right arm --               Labs and Imaging  CT head wo IV contrast   Final Result   No  evidence of acute intracranial hemorrhage or depressed calvarial   fracture.        No evidence of acute displaced maxillofacial fracture.        MACRO:   None        Signed by: Fan Kessler 7/6/2025 12:17 AM   Dictation workstation:   [id number]      CT maxillofacial bones wo IV contrast   Final Result   No evidence of acute intracranial hemorrhage or depressed calvarial   fracture.        No evidence of acute displaced maxillofacial fracture.        MACRO:   None        Signed by: Fan Kessler 7/6/2025 12:17 AM   Dictation workstation:   [id number]      CT 3D reconstruction   Final Result   No evidence of acute intracranial hemorrhage or depressed calvarial   fracture.        No evidence of acute displaced maxillofacial fracture.        MACRO:   None        Signed by: Fan Kessler 7/6/2025 12:17 AM   Dictation workstation:   [id number]        Labs Reviewed   CBC - Abnormal       Result Value    WBC 11.6 (*)     nRBC 0.0      RBC 4.19 (*)     Hemoglobin 12.9 (*)     Hematocrit 39.4 (*)     MCV 94      MCH 30.8      MCHC 32.7      RDW 13.2      Platelets 190     BASIC METABOLIC PANEL - Abnormal    Glucose 115 (*)     Sodium 137      Potassium 3.7      Chloride 105      Bicarbonate 24      Anion Gap 12      Urea Nitrogen 16      Creatinine 0.79      eGFR >90      Calcium 8.3 (*)    APTT - Abnormal    aPTT 19 (*)     Narrative:     The APTT is no longer used for monitoring Unfractionated Heparin Therapy. For monitoring Heparin Therapy, use the Heparin Assay.   PROTIME-INR - Normal    Protime 11.3      INR 1.0     ALCOHOL - Normal    Alcohol <10             Procedure  Critical Care    Performed by: Mike Woodard DO  Authorized by: Mike Woodard DO    Critical care provider statement:     Critical care time (minutes):  35    Critical care time was exclusive of:  Separately billable procedures and treating other patients    Critical care was necessary to treat or prevent imminent or life-threatening  deterioration of the following conditions:  Trauma and CNS failure or compromise    Critical care was time spent personally by me on the following activities:  Development of treatment plan with patient or surrogate, evaluation of patient's response to treatment, examination of patient, obtaining history from patient or surrogate, ordering and performing treatments and interventions, ordering and review of laboratory studies, ordering and review of radiographic studies, pulse oximetry, re-evaluation of patient's condition and discussions with consultants    Care discussed with: accepting provider at another facility                            [1] No Known Allergies       Mike Woodard DO  07/06/25 0521
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
## 2025-07-06 NOTE — DISCHARGE INSTRUCTIONS
Please follow-up with the Ophthalmologists on Monday at [facility] or [facility], time and location to be confirmed with you tomorrow by the eye doctors.     Please call [phone] for appointment.    Keep bed rest, elevate head of bed, and avoid bending or heavy lifting  Keep a shield over your eye  Avoid blood thinning products unless medically indicated (aspirin, NSAIDs, etc.)
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
## 2025-07-07 ENCOUNTER — OFFICE VISIT (OUTPATIENT)
Dept: OPHTHALMOLOGY | Facility: CLINIC | Age: 40
End: 2025-07-07
Payer: COMMERCIAL

## 2025-07-07 DIAGNOSIS — H21.02 HYPHEMA OF LEFT EYE: ICD-10-CM

## 2025-07-07 LAB
ATRIAL RATE: 43 BPM
P OFFSET: 200 MS
P ONSET: 139 MS
PR INTERVAL: 148 MS
Q ONSET: 213 MS
QRS COUNT: 7 BEATS
QRS DURATION: 130 MS
QT INTERVAL: 528 MS
QTC CALCULATION(BAZETT): 446 MS
QTC FREDERICIA: 472 MS
R AXIS: 87 DEGREES
T AXIS: 78 DEGREES
T OFFSET: 477 MS
VENTRICULAR RATE: 43 BPM

## 2025-07-07 PROCEDURE — 99214 OFFICE O/P EST MOD 30 MIN: CPT | Performed by: OPHTHALMOLOGY

## 2025-07-07 PROCEDURE — 99204 OFFICE O/P NEW MOD 45 MIN: CPT | Performed by: OPHTHALMOLOGY

## 2025-07-07 RX ORDER — PREDNISOLONE ACETATE 10 MG/ML
1 SUSPENSION/ DROPS OPHTHALMIC 4 TIMES DAILY
Start: 2025-07-07 | End: 2025-07-08 | Stop reason: SDUPTHER

## 2025-07-07 RX ORDER — CYCLOPENTOLATE HYDROCHLORIDE 10 MG/ML
1 SOLUTION/ DROPS OPHTHALMIC 3 TIMES DAILY
Start: 2025-07-07

## 2025-07-07 ASSESSMENT — TONOMETRY
OS_IOP_MMHG: 08
OD_IOP_MMHG: 12
IOP_METHOD: GOLDMANN APPLANATION

## 2025-07-07 ASSESSMENT — ENCOUNTER SYMPTOMS
RESPIRATORY NEGATIVE: 0
PSYCHIATRIC NEGATIVE: 0
EYES NEGATIVE: 0
GASTROINTESTINAL NEGATIVE: 0
HEMATOLOGIC/LYMPHATIC NEGATIVE: 0
CARDIOVASCULAR NEGATIVE: 0
CONSTITUTIONAL NEGATIVE: 0
MUSCULOSKELETAL NEGATIVE: 0
ALLERGIC/IMMUNOLOGIC NEGATIVE: 0
ENDOCRINE NEGATIVE: 0
NEUROLOGICAL NEGATIVE: 0

## 2025-07-07 ASSESSMENT — SLIT LAMP EXAM - LIDS: COMMENTS: NORMAL

## 2025-07-07 ASSESSMENT — VISUAL ACUITY
OS_SC: HM
OD_SC: 20/20
METHOD: SNELLEN - LINEAR

## 2025-07-07 ASSESSMENT — EXTERNAL EXAM - RIGHT EYE: OD_EXAM: NORMAL

## 2025-07-07 ASSESSMENT — EXTERNAL EXAM - LEFT EYE: OS_EXAM: NORMAL

## 2025-07-07 ASSESSMENT — CUP TO DISC RATIO: OD_RATIO: 0.3

## 2025-07-07 ASSESSMENT — PAIN SCALES - GENERAL: PAINLEVEL_OUTOF10: 0-NO PAIN

## 2025-07-07 NOTE — PATIENT INSTRUCTIONS
Thank you so much for choosing me to provide your care today!    If you were dilated your vision may remain blurry   or light sensitive for several hours.    The nature of eye and vision problems can require frequent follow up, please make every effort to adhere to any future appointments.    If you have any issues, questions, or concerns,   please do not hesitate to reach out.    If you receive a survey in regards to your care today, please mention any exceptional care my office staff and/or technicians provided.    You can reach our office at this number:    [phone]    Please consider signing up for and utilizing [employer]!  This is the best way to directly reach me or other  providers

## 2025-07-07 NOTE — ASSESSMENT & PLAN NOTE
Hyphema appears improved without re-bleed in comparison to documented exam. IOP stable. Advised will increase prednisolone to QID as may have fibrinous membrane behind and showing some increased external inflammation. Increase cyclogyl to TID. Will see back tomorrow. Given eye shield to wear at all times and discussed precautions including activity restrictions. Continue erythromycin to external laceration

## 2025-07-07 NOTE — PROGRESS NOTES
Assessment/Plan   Problem List Items Addressed This Visit       Hyphema of left eye    Hyphema appears improved without re-bleed in comparison to documented exam. IOP stable. Advised will increase prednisolone to QID as may have fibrinous membrane behind and showing some increased external inflammation. Increase cyclogyl to TID. Will see back tomorrow. Given eye shield to wear at all times and discussed precautions including activity restrictions. Continue erythromycin to external laceration         Relevant Medications    prednisoLONE acetate (Pred-Forte) 1 % ophthalmic suspension    cyclopentolate (CyclogyL) 1 % ophthalmic solution       Provided reassurance regarding above diagnoses and care received in the office visit today. Discussed outcomes and options along with the importance of treatment compliance. Understands the importance of any follow up visits. Patient instructed to call/communicate with our office if any new issues, questions, or concerns.     Will plan to see back in 1 day short check or sooner PRN

## 2025-07-08 ENCOUNTER — OFFICE VISIT (OUTPATIENT)
Dept: OPHTHALMOLOGY | Facility: CLINIC | Age: 40
End: 2025-07-08
Payer: COMMERCIAL

## 2025-07-08 DIAGNOSIS — H21.02 HYPHEMA OF LEFT EYE: Primary | ICD-10-CM

## 2025-07-08 PROCEDURE — 99213 OFFICE O/P EST LOW 20 MIN: CPT | Performed by: OPHTHALMOLOGY

## 2025-07-08 RX ORDER — PREDNISOLONE ACETATE 10 MG/ML
1 SUSPENSION/ DROPS OPHTHALMIC
Start: 2025-07-08

## 2025-07-08 ASSESSMENT — ENCOUNTER SYMPTOMS
ALLERGIC/IMMUNOLOGIC NEGATIVE: 0
HEMATOLOGIC/LYMPHATIC NEGATIVE: 0
RESPIRATORY NEGATIVE: 0
EYES NEGATIVE: 0
ENDOCRINE NEGATIVE: 0
NEUROLOGICAL NEGATIVE: 0
GASTROINTESTINAL NEGATIVE: 0
MUSCULOSKELETAL NEGATIVE: 0
PSYCHIATRIC NEGATIVE: 0
CARDIOVASCULAR NEGATIVE: 0
CONSTITUTIONAL NEGATIVE: 0

## 2025-07-08 ASSESSMENT — VISUAL ACUITY
OD_SC: 20/20
OS_SC: HM
METHOD: SNELLEN - LINEAR

## 2025-07-08 ASSESSMENT — TONOMETRY
OS_IOP_MMHG: 3
IOP_METHOD: GOLDMANN APPLANATION

## 2025-07-08 ASSESSMENT — EXTERNAL EXAM - RIGHT EYE: OD_EXAM: NORMAL

## 2025-07-08 ASSESSMENT — PAIN SCALES - GENERAL: PAINLEVEL_OUTOF10: 0-NO PAIN

## 2025-07-08 ASSESSMENT — EXTERNAL EXAM - LEFT EYE: OS_EXAM: NORMAL

## 2025-07-08 ASSESSMENT — SLIT LAMP EXAM - LIDS: COMMENTS: NORMAL

## 2025-07-08 NOTE — ASSESSMENT & PLAN NOTE
Improved hyphema today. Still showing some significant inflammation and low IOP. Suspect higher than measured but still low overall. OK to increase prednisolone to q2 hours while awake to help with inflammation and potentially increase IOP. Continue cyclogyl TID. See back early next week or sooner PRN. Continue activity restrictions and understands to call if any worsening symptoms.

## 2025-07-08 NOTE — PATIENT INSTRUCTIONS
Thank you so much for choosing me to provide your care today!    If you were dilated your vision may remain blurry   or light sensitive for several hours.    The nature of eye and vision problems can require frequent follow up, please make every effort to adhere to any future appointments.    If you have any issues, questions, or concerns,   please do not hesitate to reach out.    If you receive a survey in regards to your care today, please mention any exceptional care my office staff and/or technicians provided.    You can reach our office at this number:    [phone]    Please consider signing up for and utilizing [employer]!  This is the best way to directly reach me or other  providers

## 2025-07-08 NOTE — PROGRESS NOTES
Assessment/Plan   Problem List Items Addressed This Visit       Hyphema of left eye - Primary    Improved hyphema today. Still showing some significant inflammation and low IOP. Suspect higher than measured but still low overall. OK to increase prednisolone to q2 hours while awake to help with inflammation and potentially increase IOP. Continue cyclogyl TID. See back early next week or sooner PRN. Continue activity restrictions and understands to call if any worsening symptoms.          Relevant Medications    prednisoLONE acetate (Pred-Forte) 1 % ophthalmic suspension       Provided reassurance regarding above diagnoses and care received in the office visit today. Discussed outcomes and options along with the importance of treatment compliance. Understands the importance of any follow up visits. Patient instructed to call/communicate with our office if any new issues, questions, or concerns.     Will plan to see back in 1 week short check or sooner PRN

## 2025-07-11 ENCOUNTER — TELEPHONE (OUTPATIENT)
Dept: OPHTHALMOLOGY | Facility: CLINIC | Age: 40
End: 2025-07-11
Payer: COMMERCIAL

## 2025-07-11 NOTE — TELEPHONE ENCOUNTER
Pt is out of oxycodone that was given to him at the ER. He wants to know if you will prescribe him more?

## 2025-07-14 ENCOUNTER — APPOINTMENT (OUTPATIENT)
Dept: OPHTHALMOLOGY | Facility: CLINIC | Age: 40
End: 2025-07-14
Payer: COMMERCIAL

## 2025-07-14 DIAGNOSIS — H21.02 HYPHEMA OF LEFT EYE: ICD-10-CM

## 2025-07-14 PROCEDURE — 99213 OFFICE O/P EST LOW 20 MIN: CPT | Performed by: OPHTHALMOLOGY

## 2025-07-14 RX ORDER — CYCLOPENTOLATE HYDROCHLORIDE 10 MG/ML
1 SOLUTION/ DROPS OPHTHALMIC 3 TIMES DAILY
Qty: 2.5 ML | Refills: 0 | Status: SHIPPED | OUTPATIENT
Start: 2025-07-14

## 2025-07-14 RX ORDER — PREDNISOLONE ACETATE 10 MG/ML
1 SUSPENSION/ DROPS OPHTHALMIC
Qty: 5 ML | Refills: 0 | Status: SHIPPED | OUTPATIENT
Start: 2025-07-14

## 2025-07-14 ASSESSMENT — VISUAL ACUITY
OD_SC: 20/20
METHOD: SNELLEN - LINEAR
OS_SC: CF AT 3'
OS_PH_SC: 20/400
OD_SC+: -1
OS_PH_SC+: -1

## 2025-07-14 ASSESSMENT — ENCOUNTER SYMPTOMS
NEUROLOGICAL NEGATIVE: 0
MUSCULOSKELETAL NEGATIVE: 0
CARDIOVASCULAR NEGATIVE: 0
PSYCHIATRIC NEGATIVE: 0
ENDOCRINE NEGATIVE: 0
RESPIRATORY NEGATIVE: 0
HEMATOLOGIC/LYMPHATIC NEGATIVE: 0
ALLERGIC/IMMUNOLOGIC NEGATIVE: 0
CONSTITUTIONAL NEGATIVE: 0
GASTROINTESTINAL NEGATIVE: 0
EYES NEGATIVE: 0

## 2025-07-14 ASSESSMENT — PAIN SCALES - GENERAL: PAINLEVEL_OUTOF10: 0-NO PAIN

## 2025-07-14 ASSESSMENT — SLIT LAMP EXAM - LIDS: COMMENTS: NORMAL

## 2025-07-14 ASSESSMENT — EXTERNAL EXAM - RIGHT EYE: OD_EXAM: NORMAL

## 2025-07-14 ASSESSMENT — TONOMETRY
OS_IOP_MMHG: 2
IOP_METHOD: GOLDMANN APPLANATION

## 2025-07-14 ASSESSMENT — EXTERNAL EXAM - LEFT EYE: OS_EXAM: NORMAL

## 2025-07-14 NOTE — PROGRESS NOTES
Assessment/Plan   Problem List Items Addressed This Visit       Hyphema of left eye    Improving hyphema but pressure remains low, ? Hypotony secodary to an angle injury. Poor view to periphery through heme and early cataract but no obvious choroidals. Has been on drops improperly, will get back onto steroid more frequently and follow up in 1 week. Likely will need eval with glaucoma for evaluation of anterior segment changes and potential angle injury.          Relevant Medications    cyclopentolate (CyclogyL) 1 % ophthalmic solution    prednisoLONE acetate (Pred-Forte) 1 % ophthalmic suspension       Provided reassurance regarding above diagnoses and care received in the office visit today. Discussed outcomes and options along with the importance of treatment compliance. Understands the importance of any follow up visits. Patient instructed to call/communicate with our office if any new issues, questions, or concerns.     Will plan to see back in 1 week short check or sooner PRN

## 2025-07-14 NOTE — ASSESSMENT & PLAN NOTE
Improving hyphema but pressure remains low, ? Hypotony secodary to an angle injury. Poor view to periphery through heme and early cataract but no obvious choroidals. Has been on drops improperly, will get back onto steroid more frequently and follow up in 1 week. Likely will need eval with glaucoma for evaluation of anterior segment changes and potential angle injury.

## 2025-07-14 NOTE — PATIENT INSTRUCTIONS
Thank you so much for choosing me to provide your care today!    If you were dilated your vision may remain blurry   or light sensitive for several hours.    The nature of eye and vision problems can require frequent follow up, please make every effort to adhere to any future appointments.    If you have any issues, questions, or concerns,   please do not hesitate to reach out.    If you receive a survey in regards to your care today, please mention any exceptional care my office staff and/or technicians provided.    You can reach our office at this number:    [phone]    Please consider signing up for and utilizing [employer]!  This is the best way to directly reach me or other  providers

## 2025-07-23 ENCOUNTER — APPOINTMENT (OUTPATIENT)
Dept: OPHTHALMOLOGY | Facility: CLINIC | Age: 40
End: 2025-07-23
Payer: COMMERCIAL

## 2025-07-23 DIAGNOSIS — H26.112 LOCALIZED TRAUMATIC CATARACT OF LEFT EYE: ICD-10-CM

## 2025-07-23 DIAGNOSIS — H27.8 PHACODONESIS: ICD-10-CM

## 2025-07-23 DIAGNOSIS — H21.02 HYPHEMA OF LEFT EYE: Primary | ICD-10-CM

## 2025-07-23 DIAGNOSIS — H43.12 VITREOUS HEMORRHAGE OF LEFT EYE (MULTI): ICD-10-CM

## 2025-07-23 PROCEDURE — 99213 OFFICE O/P EST LOW 20 MIN: CPT | Performed by: OPHTHALMOLOGY

## 2025-07-23 RX ORDER — PREDNISOLONE ACETATE 10 MG/ML
SUSPENSION/ DROPS OPHTHALMIC
Qty: 5 ML | Refills: 0 | Status: SHIPPED | OUTPATIENT
Start: 2025-07-23

## 2025-07-23 ASSESSMENT — PAIN SCALES - GENERAL: PAINLEVEL_OUTOF10: 0-NO PAIN

## 2025-07-23 ASSESSMENT — VISUAL ACUITY
OS_PH_SC: 20/200
METHOD: SNELLEN - LINEAR
OS_PH_SC+: +2
OD_SC: 20/20
OS_SC: 20/400

## 2025-07-23 ASSESSMENT — EXTERNAL EXAM - RIGHT EYE: OD_EXAM: NORMAL

## 2025-07-23 ASSESSMENT — ENCOUNTER SYMPTOMS
NEUROLOGICAL NEGATIVE: 0
PSYCHIATRIC NEGATIVE: 0
RESPIRATORY NEGATIVE: 0
ENDOCRINE NEGATIVE: 0
ALLERGIC/IMMUNOLOGIC NEGATIVE: 0
GASTROINTESTINAL NEGATIVE: 0
MUSCULOSKELETAL NEGATIVE: 0
CARDIOVASCULAR NEGATIVE: 0
HEMATOLOGIC/LYMPHATIC NEGATIVE: 0
CONSTITUTIONAL NEGATIVE: 0
EYES NEGATIVE: 0

## 2025-07-23 ASSESSMENT — SLIT LAMP EXAM - LIDS
COMMENTS: NORMAL
COMMENTS: NORMAL

## 2025-07-23 ASSESSMENT — EXTERNAL EXAM - LEFT EYE: OS_EXAM: NORMAL

## 2025-07-23 ASSESSMENT — CUP TO DISC RATIO: OS_RATIO: 0.25

## 2025-07-23 NOTE — PATIENT INSTRUCTIONS
Thank you so much for choosing me to provide your care today!    If you were dilated your vision may remain blurry   or light sensitive for several hours.    The nature of eye and vision problems can require frequent follow up, please make every effort to adhere to any future appointments.    If you have any issues, questions, or concerns,   please do not hesitate to reach out.    If you receive a survey in regards to your care today, please mention any exceptional care my office staff and/or technicians provided.    You can reach our office at this number:    [phone]    Please consider signing up for and utilizing [employer]!  This is the best way to directly reach me or other  providers

## 2025-07-24 NOTE — ASSESSMENT & PLAN NOTE
Presumed secondary to iris trauma with ciliary damage. Will ask for retina opinion as lens appears nasally displaced and may require complex extraction.

## 2025-07-24 NOTE — ASSESSMENT & PLAN NOTE
Well clearing, anterior chamber (AC) well formed and corneal changes resolved. OK to begin taper off steroid.

## 2025-07-24 NOTE — PROGRESS NOTES
Assessment/Plan   Problem List Items Addressed This Visit       Hyphema of left eye - Primary    Well clearing, anterior chamber (AC) well formed and corneal changes resolved. OK to begin taper off steroid.          Relevant Medications    prednisoLONE acetate (Pred-Forte) 1 % ophthalmic suspension    Vitreous hemorrhage of left eye (Multi)    Presumed secondary to iris trauma with ciliary damage. Will ask for retina opinion as lens appears nasally displaced and may require complex extraction.          Localized traumatic cataract of left eye    Likely complex extraction with iris reconstruction, hold on referral pending retina evaluation.          Phacodonesis    As per cataract assessment            Provided reassurance regarding above diagnoses and care received in the office visit today. Discussed outcomes and options along with the importance of treatment compliance. Understands the importance of any follow up visits. Patient instructed to call/communicate with our office if any new issues, questions, or concerns.     Will plan to see back in 2 weeks follow up or sooner PRN

## 2025-08-01 ENCOUNTER — OFFICE VISIT (OUTPATIENT)
Dept: OPHTHALMOLOGY | Facility: CLINIC | Age: 40
End: 2025-08-01
Payer: COMMERCIAL

## 2025-08-01 DIAGNOSIS — H27.8 PHACODONESIS: ICD-10-CM

## 2025-08-01 DIAGNOSIS — H43.12 VITREOUS HEMORRHAGE OF LEFT EYE (MULTI): ICD-10-CM

## 2025-08-01 DIAGNOSIS — H53.122 TRANSIENT VISUAL LOSS OF LEFT EYE: Primary | ICD-10-CM

## 2025-08-01 DIAGNOSIS — H26.112 LOCALIZED TRAUMATIC CATARACT OF LEFT EYE: ICD-10-CM

## 2025-08-01 DIAGNOSIS — H40.052 OCULAR HYPERTENSION OF LEFT EYE: ICD-10-CM

## 2025-08-01 PROCEDURE — 99213 OFFICE O/P EST LOW 20 MIN: CPT | Performed by: OPHTHALMOLOGY

## 2025-08-01 RX ORDER — DORZOLAMIDE HYDROCHLORIDE AND TIMOLOL MALEATE 20; 5 MG/ML; MG/ML
1 SOLUTION/ DROPS OPHTHALMIC 2 TIMES DAILY
Qty: 10 ML | Refills: 0 | Status: SHIPPED | OUTPATIENT
Start: 2025-08-01

## 2025-08-01 ASSESSMENT — PATIENT HEALTH QUESTIONNAIRE - PHQ9
2. FEELING DOWN, DEPRESSED OR HOPELESS: NOT AT ALL
1. LITTLE INTEREST OR PLEASURE IN DOING THINGS: NOT AT ALL
SUM OF ALL RESPONSES TO PHQ9 QUESTIONS 1 AND 2: 0

## 2025-08-01 ASSESSMENT — ENCOUNTER SYMPTOMS
NEUROLOGICAL NEGATIVE: 0
ALLERGIC/IMMUNOLOGIC NEGATIVE: 0
CARDIOVASCULAR NEGATIVE: 0
MUSCULOSKELETAL NEGATIVE: 0
EYES NEGATIVE: 0
CONSTITUTIONAL NEGATIVE: 0
ENDOCRINE NEGATIVE: 0
GASTROINTESTINAL NEGATIVE: 0
RESPIRATORY NEGATIVE: 0
HEMATOLOGIC/LYMPHATIC NEGATIVE: 0
PSYCHIATRIC NEGATIVE: 0

## 2025-08-01 ASSESSMENT — TONOMETRY
IOP_METHOD: GOLDMANN APPLANATION
OD_IOP_MMHG: 10
OS_IOP_MMHG: 23
IOP_METHOD: TONOPEN
OS_IOP_MMHG: 20

## 2025-08-01 ASSESSMENT — CUP TO DISC RATIO: OS_RATIO: 0.25

## 2025-08-01 ASSESSMENT — SLIT LAMP EXAM - LIDS
COMMENTS: NORMAL
COMMENTS: NORMAL

## 2025-08-01 ASSESSMENT — VISUAL ACUITY
METHOD: SNELLEN - LINEAR
OD_SC: 20/20
OS_SC: CF AT 3'
CORRECTION_TYPE: GLASSES

## 2025-08-01 ASSESSMENT — PAIN SCALES - GENERAL: PAINLEVEL_OUTOF10: 0-NO PAIN

## 2025-08-01 ASSESSMENT — EXTERNAL EXAM - RIGHT EYE: OD_EXAM: NORMAL

## 2025-08-01 ASSESSMENT — EXTERNAL EXAM - LEFT EYE: OS_EXAM: NORMAL

## 2025-08-01 NOTE — PATIENT INSTRUCTIONS
Thank you so much for choosing me to provide your care today!    If you were dilated your vision may remain blurry   or light sensitive for several hours.    The nature of eye and vision problems can require frequent follow up, please make every effort to adhere to any future appointments.    If you have any issues, questions, or concerns,   please do not hesitate to reach out.    If you receive a survey in regards to your care today, please mention any exceptional care my office staff and/or technicians provided.    You can reach our office at this number:    [phone]    Please consider signing up for and utilizing [employer]!  This is the best way to directly reach me or other  providers

## 2025-08-01 NOTE — ASSESSMENT & PLAN NOTE
Scheduled for retina eval in setting of likely complex cataract extraction (CE) and sutured intraocular lens (IOL).

## 2025-08-01 NOTE — PROGRESS NOTES
Assessment/Plan   Problem List Items Addressed This Visit       Vitreous hemorrhage of left eye (Multi)    Improved in interim, retina consult upcoming.          Localized traumatic cataract of left eye    Phacodonesis    Scheduled for retina eval in setting of likely complex cataract extraction (CE) and sutured intraocular lens (IOL).          Transient visual loss of left eye - Primary    Unclear etiology, retina appears intact, slightly better view today. Suspect with increased IOP, may be having episodes of IOP spike considering iris damage. Will start cosopt BID and see if better pressure control helpful. Scheduled for consult with retina as likely needs complex cataract extraction (CE) and sutured intraocular lens (IOL).          Ocular hypertension of left eye    Relevant Medications    dorzolamide-timoloL (Cosopt) 22.3-6.8 mg/mL ophthalmic solution       Provided reassurance regarding above diagnoses and care received in the office visit today. Discussed outcomes and options along with the importance of treatment compliance. Understands the importance of any follow up visits. Patient instructed to call/communicate with our office if any new issues, questions, or concerns.     Will plan to see back as scheduled or sooner PRN

## 2025-08-01 NOTE — ASSESSMENT & PLAN NOTE
Unclear etiology, retina appears intact, slightly better view today. Suspect with increased IOP, may be having episodes of IOP spike considering iris damage. Will start cosopt BID and see if better pressure control helpful. Scheduled for consult with retina as likely needs complex cataract extraction (CE) and sutured intraocular lens (IOL).

## 2025-08-06 ENCOUNTER — APPOINTMENT (OUTPATIENT)
Dept: OPHTHALMOLOGY | Facility: CLINIC | Age: 40
End: 2025-08-06
Payer: COMMERCIAL

## 2025-08-06 DIAGNOSIS — H53.122 TRANSIENT VISUAL LOSS OF LEFT EYE: ICD-10-CM

## 2025-08-06 DIAGNOSIS — H21.02 HYPHEMA OF LEFT EYE: ICD-10-CM

## 2025-08-06 DIAGNOSIS — H40.052 OCULAR HYPERTENSION OF LEFT EYE: Primary | ICD-10-CM

## 2025-08-06 DIAGNOSIS — H27.8 PHACODONESIS: ICD-10-CM

## 2025-08-06 DIAGNOSIS — H26.112 LOCALIZED TRAUMATIC CATARACT OF LEFT EYE: ICD-10-CM

## 2025-08-06 PROCEDURE — 99213 OFFICE O/P EST LOW 20 MIN: CPT | Performed by: OPHTHALMOLOGY

## 2025-08-06 ASSESSMENT — EXTERNAL EXAM - LEFT EYE: OS_EXAM: NORMAL

## 2025-08-06 ASSESSMENT — ENCOUNTER SYMPTOMS
MUSCULOSKELETAL NEGATIVE: 0
EYES NEGATIVE: 0
CARDIOVASCULAR NEGATIVE: 0
NEUROLOGICAL NEGATIVE: 0
RESPIRATORY NEGATIVE: 0
HEMATOLOGIC/LYMPHATIC NEGATIVE: 0
GASTROINTESTINAL NEGATIVE: 0
ALLERGIC/IMMUNOLOGIC NEGATIVE: 0
CONSTITUTIONAL NEGATIVE: 0
PSYCHIATRIC NEGATIVE: 0
ENDOCRINE NEGATIVE: 0

## 2025-08-06 ASSESSMENT — SLIT LAMP EXAM - LIDS
COMMENTS: NORMAL
COMMENTS: NORMAL

## 2025-08-06 ASSESSMENT — VISUAL ACUITY
OD_SC: 20/20
OS_PH_SC: 20/300
METHOD: SNELLEN - LINEAR
OS_PH_SC+: +2
OS_SC: 20/400

## 2025-08-06 ASSESSMENT — PAIN SCALES - GENERAL: PAINLEVEL_OUTOF10: 0-NO PAIN

## 2025-08-06 ASSESSMENT — TONOMETRY
OS_IOP_MMHG: 12
IOP_METHOD: GOLDMANN APPLANATION

## 2025-08-06 ASSESSMENT — EXTERNAL EXAM - RIGHT EYE: OD_EXAM: NORMAL

## 2025-08-06 NOTE — ASSESSMENT & PLAN NOTE
Nasal displacement and phacodonesis. Due to see retina, likely needs complex extraction and fixated lens.

## 2025-08-06 NOTE — PROGRESS NOTES
Assessment/Plan   Problem List Items Addressed This Visit       Hyphema of left eye    Good resolution, will continue steroid until seen by retina.          Localized traumatic cataract of left eye    Nasal displacement and phacodonesis. Due to see retina, likely needs complex extraction and fixated lens.          Phacodonesis    As per cataract assessment.          Transient visual loss of left eye    Symptoms resolved with pressure control.          Ocular hypertension of left eye - Primary    Back in normal range and discomfort along with transient vision changes resolved. Will have continue cosopt BID along with steroid BID            Provided reassurance regarding above diagnoses and care received in the office visit today. Discussed outcomes and options along with the importance of treatment compliance. Understands the importance of any follow up visits. Patient instructed to call/communicate with our office if any new issues, questions, or concerns.     Will plan to see back after retina disposition or sooner PRN

## 2025-08-06 NOTE — PATIENT INSTRUCTIONS
Thank you so much for choosing me to provide your care today!    If you were dilated your vision may remain blurry   or light sensitive for several hours.    The nature of eye and vision problems can require frequent follow up, please make every effort to adhere to any future appointments.    If you have any issues, questions, or concerns,   please do not hesitate to reach out.    If you receive a survey in regards to your care today, please mention any exceptional care my office staff and/or technicians provided.    You can reach our office at this number:    [phone]    Please consider signing up for and utilizing [employer]!  This is the best way to directly reach me or other  providers

## 2025-08-06 NOTE — ASSESSMENT & PLAN NOTE
Back in normal range and discomfort along with transient vision changes resolved. Will have continue cosopt BID along with steroid BID

## 2025-08-14 ENCOUNTER — APPOINTMENT (OUTPATIENT)
Dept: OPHTHALMOLOGY | Facility: CLINIC | Age: 40
End: 2025-08-14
Payer: COMMERCIAL

## 2025-08-14 DIAGNOSIS — H27.112 SUBLUXATION OF LEFT LENS: ICD-10-CM

## 2025-08-14 DIAGNOSIS — H26.112 LOCALIZED TRAUMATIC CATARACT OF LEFT EYE: ICD-10-CM

## 2025-08-14 DIAGNOSIS — H53.122 TRANSIENT VISUAL LOSS OF LEFT EYE: ICD-10-CM

## 2025-08-14 DIAGNOSIS — H40.052 OCULAR HYPERTENSION OF LEFT EYE: Primary | ICD-10-CM

## 2025-08-14 PROCEDURE — 92136 OPHTHALMIC BIOMETRY: CPT | Mod: BILATERAL PROCEDURE

## 2025-08-14 PROCEDURE — 92134 CPTRZ OPH DX IMG PST SGM RTA: CPT

## 2025-08-14 PROCEDURE — 99214 OFFICE O/P EST MOD 30 MIN: CPT

## 2025-08-14 ASSESSMENT — TONOMETRY
IOP_METHOD: GOLDMANN APPLANATION
OD_IOP_MMHG: 10
OS_IOP_MMHG: 11

## 2025-08-14 ASSESSMENT — SLIT LAMP EXAM - LIDS
COMMENTS: NORMAL
COMMENTS: NORMAL

## 2025-08-14 ASSESSMENT — VISUAL ACUITY
OD_SC: 20/20
METHOD: SNELLEN - LINEAR

## 2025-08-14 ASSESSMENT — ENCOUNTER SYMPTOMS
EYES NEGATIVE: 1
MUSCULOSKELETAL NEGATIVE: 0
RESPIRATORY NEGATIVE: 0
NEUROLOGICAL NEGATIVE: 0
CONSTITUTIONAL NEGATIVE: 0
ALLERGIC/IMMUNOLOGIC NEGATIVE: 0
ENDOCRINE NEGATIVE: 0
CARDIOVASCULAR NEGATIVE: 0
HEMATOLOGIC/LYMPHATIC NEGATIVE: 0
PSYCHIATRIC NEGATIVE: 0
GASTROINTESTINAL NEGATIVE: 0

## 2025-08-14 ASSESSMENT — EXTERNAL EXAM - LEFT EYE: OS_EXAM: NORMAL

## 2025-08-14 ASSESSMENT — EXTERNAL EXAM - RIGHT EYE: OD_EXAM: NORMAL
